# Patient Record
Sex: FEMALE | Race: WHITE
[De-identification: names, ages, dates, MRNs, and addresses within clinical notes are randomized per-mention and may not be internally consistent; named-entity substitution may affect disease eponyms.]

---

## 2020-07-27 ENCOUNTER — HOSPITAL ENCOUNTER (EMERGENCY)
Dept: HOSPITAL 62 - ER | Age: 25
Discharge: HOME | End: 2020-07-27
Payer: MEDICAID

## 2020-07-27 VITALS — DIASTOLIC BLOOD PRESSURE: 98 MMHG | SYSTOLIC BLOOD PRESSURE: 127 MMHG

## 2020-07-27 DIAGNOSIS — O26.891: Primary | ICD-10-CM

## 2020-07-27 DIAGNOSIS — Z3A.01: ICD-10-CM

## 2020-07-27 DIAGNOSIS — R10.814: ICD-10-CM

## 2020-07-27 DIAGNOSIS — R11.0: ICD-10-CM

## 2020-07-27 DIAGNOSIS — R10.9: ICD-10-CM

## 2020-07-27 LAB
ADD MANUAL DIFF: NO
ALBUMIN SERPL-MCNC: 4.4 G/DL (ref 3.5–5)
ALP SERPL-CCNC: 95 U/L (ref 38–126)
ANION GAP SERPL CALC-SCNC: 5 MMOL/L (ref 5–19)
APPEARANCE UR: CLEAR
APTT PPP: YELLOW S
AST SERPL-CCNC: 24 U/L (ref 14–36)
BASOPHILS # BLD AUTO: 0 10^3/UL (ref 0–0.2)
BASOPHILS NFR BLD AUTO: 0.5 % (ref 0–2)
BILIRUB DIRECT SERPL-MCNC: 0 MG/DL (ref 0–0.4)
BILIRUB SERPL-MCNC: 0.2 MG/DL (ref 0.2–1.3)
BILIRUB UR QL STRIP: NEGATIVE
BUN SERPL-MCNC: 11 MG/DL (ref 7–20)
CALCIUM: 9.5 MG/DL (ref 8.4–10.2)
CHLORIDE SERPL-SCNC: 106 MMOL/L (ref 98–107)
CO2 SERPL-SCNC: 26 MMOL/L (ref 22–30)
EOSINOPHIL # BLD AUTO: 0.1 10^3/UL (ref 0–0.6)
EOSINOPHIL NFR BLD AUTO: 0.6 % (ref 0–6)
ERYTHROCYTE [DISTWIDTH] IN BLOOD BY AUTOMATED COUNT: 16.8 % (ref 11.5–14)
GLUCOSE SERPL-MCNC: 92 MG/DL (ref 75–110)
GLUCOSE UR STRIP-MCNC: NEGATIVE MG/DL
HCT VFR BLD CALC: 37.6 % (ref 36–47)
HGB BLD-MCNC: 12.5 G/DL (ref 12–15.5)
KETONES UR STRIP-MCNC: NEGATIVE MG/DL
LYMPHOCYTES # BLD AUTO: 2.6 10^3/UL (ref 0.5–4.7)
LYMPHOCYTES NFR BLD AUTO: 25.3 % (ref 13–45)
MCH RBC QN AUTO: 24.6 PG (ref 27–33.4)
MCHC RBC AUTO-ENTMCNC: 33.2 G/DL (ref 32–36)
MCV RBC AUTO: 74 FL (ref 80–97)
MONOCYTES # BLD AUTO: 0.5 10^3/UL (ref 0.1–1.4)
MONOCYTES NFR BLD AUTO: 5 % (ref 3–13)
NEUTROPHILS # BLD AUTO: 7.1 10^3/UL (ref 1.7–8.2)
NEUTS SEG NFR BLD AUTO: 68.6 % (ref 42–78)
PH UR STRIP: 6 [PH] (ref 5–9)
PLATELET # BLD: 218 10^3/UL (ref 150–450)
POTASSIUM SERPL-SCNC: 4.3 MMOL/L (ref 3.6–5)
PROT SERPL-MCNC: 7.5 G/DL (ref 6.3–8.2)
PROT UR STRIP-MCNC: NEGATIVE MG/DL
RBC # BLD AUTO: 5.08 10^6/UL (ref 3.72–5.28)
SP GR UR STRIP: 1.02
TOTAL CELLS COUNTED % (AUTO): 100 %
UROBILINOGEN UR-MCNC: NEGATIVE MG/DL (ref ?–2)
WBC # BLD AUTO: 10.3 10^3/UL (ref 4–10.5)

## 2020-07-27 PROCEDURE — 81001 URINALYSIS AUTO W/SCOPE: CPT

## 2020-07-27 PROCEDURE — 99284 EMERGENCY DEPT VISIT MOD MDM: CPT

## 2020-07-27 PROCEDURE — 85025 COMPLETE CBC W/AUTO DIFF WBC: CPT

## 2020-07-27 PROCEDURE — 36415 COLL VENOUS BLD VENIPUNCTURE: CPT

## 2020-07-27 PROCEDURE — 83690 ASSAY OF LIPASE: CPT

## 2020-07-27 PROCEDURE — 80053 COMPREHEN METABOLIC PANEL: CPT

## 2020-07-27 PROCEDURE — 84702 CHORIONIC GONADOTROPIN TEST: CPT

## 2020-07-27 NOTE — ER DOCUMENT REPORT
ED General





- General


Chief Complaint: Abdominal Pain


Stated Complaint: STOMACH PAIN


Notes: 





Patient is a 25-year-old white female who is  at an unknown status of pre

gnancy who presents to the emergency department with a chief complaint of lower 

abdominal cramping for the past 2 weeks.  States the cramps have waxed and 

waned.  Mild to moderate in nature.  Denies it being associated with any vaginal

bleeding or discharge.  She admits that it is also associated with nausea.  She 

states all of her symptoms are primarily in the morning or at least worse in the

morning but that she sometimes notices them other times of the day.  She states 

she took a home pregnancy test which showed negative initially, her symptoms 

persisted so she took a second 1 which showed a faint line indicating pregnancy.

 She took a third and final test and it was negative again.  She called her OB 

to discuss this and they advised she come here for evaluation.  She denies any 

vomiting.  Admits to some scant diarrhea.  Denies any fever chills or night 

sweats.  No chest pain or shortness of breath.  No recent travel or known sick 

contacts.


TRAVEL OUTSIDE OF THE [place] IN LAST 30 DAYS: No





- Related Data


Allergies/Adverse Reactions: 


                                        





No Known Allergies Allergy (Verified 20 17:46)


   











Past Medical History





- Social History


Smoking Status: Unknown if Ever Smoked


Family History: Reviewed & Not Pertinent





Review of Systems





- Review of Systems


Notes: 





As per HPI





Physical Exam





- Vital signs


Vitals: 


                                        











Temp Pulse Resp BP Pulse Ox


 


 98.9 F   72   18   123/92 H  100 


 


 20 17:37  20 17:37  20 17:37  20 17:37  20 17:37














- General


General appearance: Appears well, Alert


In distress: None





- Respiratory


Respiratory status: No respiratory distress


Chest status: Nontender


Breath sounds: Normal


Chest palpation: Normal





- Cardiovascular


Rhythm: Regular


Heart sounds: Normal auscultation





- Abdominal


Inspection: Normal


Distension: No distension


Bowel sounds: Normal


Tenderness: Tender - Suprapubic and left lower quadrant


Organomegaly: No organomegaly





- Back


Back: No: CVA tenderness





- Extremities


General upper extremity: Normal inspection, Nontender, Normal color, Normal ROM,

Normal temperature


General lower extremity: Normal inspection, Nontender, Normal color, Normal ROM,

Normal temperature, Normal weight bearing.  No: Tyrese's sign





- Neurological


Neuro grossly intact: Yes


Cognition: Normal


Orientation: AAOx4


Jennifer Coma Scale Eye Opening: Spontaneous


Is[place]ia Coma Scale Verbal: Oriented


[place] Coma Scale Motor: Obeys Commands


[place] Coma Scale Total: 15


Speech: Normal





- Psychological


Associated symptoms: Normal affect, Normal mood





- Skin


Skin Temperature: Warm


Skin Moisture: Dry


Skin Color: Normal





Course





- Re-evaluation


Re-evalutation: 





20 19:13


Patient is a very low hCG.  Suspect very early gestation.  Ultrasound will not 

visualize any gestation inside or outside of the uterus at this point.  Patient 

is only in mild discomfort, resting comfortably in the room.  She has no 

bleeding or discharge.  Seriously doubt ectopic.  She will return in 48 hours 

for repeat hCG quant testing.  We discussed the importance of outpatient follow-

up with the OB.  I advised that she return here immediately with any new, 

persistent or worsening symptoms.  She verbalized understood and agreed.





- Vital Signs


Vital signs: 


                                        











Temp Pulse Resp BP Pulse Ox


 


 98.9 F   72   18   123/92 H  100 


 


 20 17:39  20 17:37  20 17:37  20 17:37  20 17:37














- Laboratory


Result Diagrams: 


                                 20 18:10





                                 20 18:10


Laboratory results interpreted by me: 


                                        











  20





  18:10 18:10


 


MCV  74 L 


 


MCH  24.6 L 


 


RDW  16.8 H 


 


Beta HCG, Quant   35.60 H














Discharge





- Discharge


Clinical Impression: 


 Abdominal cramping affecting pregnancy





Pregnancy


Qualifiers:


 Weeks of gestation: less than 8 weeks Qualified Code(s): Z3A.01 - Less than 8 

weeks gestation of pregnancy





Condition: Stable


Disposition: HOME, SELF-CARE


Instructions:  Ectopic Pregnancy Precaution ([facility]), Pelvic Pain in Pregnancy ([facility]

[facility])


Additional Instructions: 


Please call and follow-up with your OB doctor soon as possible for continued 

care and management.  Please return here in 48 hours for repeat blood pregnancy 

testing to evaluate your numbers.  Please return here or any ER immediately with

any new, persistent or worsening symptoms.